# Patient Record
Sex: MALE | Race: WHITE | NOT HISPANIC OR LATINO | URBAN - METROPOLITAN AREA
[De-identification: names, ages, dates, MRNs, and addresses within clinical notes are randomized per-mention and may not be internally consistent; named-entity substitution may affect disease eponyms.]

---

## 2017-01-29 ENCOUNTER — INPATIENT (INPATIENT)
Facility: HOSPITAL | Age: 57
LOS: 0 days | Discharge: HOME | DRG: 390 | End: 2017-01-30
Attending: SPECIALIST | Admitting: SPECIALIST
Payer: MEDICAID

## 2017-01-29 VITALS
DIASTOLIC BLOOD PRESSURE: 75 MMHG | SYSTOLIC BLOOD PRESSURE: 139 MMHG | OXYGEN SATURATION: 97 % | RESPIRATION RATE: 18 BRPM | HEIGHT: 69 IN | TEMPERATURE: 98 F | HEART RATE: 80 BPM | WEIGHT: 195.11 LBS

## 2017-01-29 DIAGNOSIS — K56.69 OTHER INTESTINAL OBSTRUCTION: ICD-10-CM

## 2017-01-29 DIAGNOSIS — Z98.890 OTHER SPECIFIED POSTPROCEDURAL STATES: Chronic | ICD-10-CM

## 2017-01-29 LAB
ALBUMIN SERPL ELPH-MCNC: 4.1 G/DL — SIGNIFICANT CHANGE UP (ref 3.5–5)
ALP SERPL-CCNC: 81 U/L — SIGNIFICANT CHANGE UP (ref 40–120)
ALT FLD-CCNC: 53 U/L DA — SIGNIFICANT CHANGE UP (ref 10–60)
ANION GAP SERPL CALC-SCNC: 9 MMOL/L — SIGNIFICANT CHANGE UP (ref 5–17)
AST SERPL-CCNC: 34 U/L — SIGNIFICANT CHANGE UP (ref 10–40)
BASOPHILS # BLD AUTO: 0.1 K/UL — SIGNIFICANT CHANGE UP (ref 0–0.2)
BASOPHILS NFR BLD AUTO: 0.5 % — SIGNIFICANT CHANGE UP (ref 0–2)
BILIRUB SERPL-MCNC: 1.4 MG/DL — HIGH (ref 0.2–1.2)
BUN SERPL-MCNC: 17 MG/DL — SIGNIFICANT CHANGE UP (ref 7–18)
CALCIUM SERPL-MCNC: 8.9 MG/DL — SIGNIFICANT CHANGE UP (ref 8.4–10.5)
CHLORIDE SERPL-SCNC: 100 MMOL/L — SIGNIFICANT CHANGE UP (ref 96–108)
CO2 SERPL-SCNC: 30 MMOL/L — SIGNIFICANT CHANGE UP (ref 22–31)
CREAT SERPL-MCNC: 1.29 MG/DL — SIGNIFICANT CHANGE UP (ref 0.5–1.3)
EOSINOPHIL # BLD AUTO: 0.2 K/UL — SIGNIFICANT CHANGE UP (ref 0–0.5)
EOSINOPHIL NFR BLD AUTO: 1.2 % — SIGNIFICANT CHANGE UP (ref 0–6)
GLUCOSE SERPL-MCNC: 218 MG/DL — HIGH (ref 70–99)
HCT VFR BLD CALC: 48.4 % — SIGNIFICANT CHANGE UP (ref 39–50)
HGB BLD-MCNC: 16.4 G/DL — SIGNIFICANT CHANGE UP (ref 13–17)
LIDOCAIN IGE QN: 154 U/L — SIGNIFICANT CHANGE UP (ref 73–393)
LYMPHOCYTES # BLD AUTO: 1.3 K/UL — SIGNIFICANT CHANGE UP (ref 1–3.3)
LYMPHOCYTES # BLD AUTO: 9.3 % — LOW (ref 13–44)
MCHC RBC-ENTMCNC: 30.5 PG — SIGNIFICANT CHANGE UP (ref 27–34)
MCHC RBC-ENTMCNC: 33.9 GM/DL — SIGNIFICANT CHANGE UP (ref 32–36)
MCV RBC AUTO: 90 FL — SIGNIFICANT CHANGE UP (ref 80–100)
MONOCYTES # BLD AUTO: 0.7 K/UL — SIGNIFICANT CHANGE UP (ref 0–0.9)
MONOCYTES NFR BLD AUTO: 4.9 % — SIGNIFICANT CHANGE UP (ref 2–14)
NEUTROPHILS # BLD AUTO: 12 K/UL — HIGH (ref 1.8–7.4)
NEUTROPHILS NFR BLD AUTO: 84.2 % — HIGH (ref 43–77)
PLATELET # BLD AUTO: 156 K/UL — SIGNIFICANT CHANGE UP (ref 150–400)
POTASSIUM SERPL-MCNC: 4.7 MMOL/L — SIGNIFICANT CHANGE UP (ref 3.5–5.3)
POTASSIUM SERPL-SCNC: 4.7 MMOL/L — SIGNIFICANT CHANGE UP (ref 3.5–5.3)
PROT SERPL-MCNC: 7.2 G/DL — SIGNIFICANT CHANGE UP (ref 6–8.3)
RBC # BLD: 5.38 M/UL — SIGNIFICANT CHANGE UP (ref 4.2–5.8)
RBC # FLD: 12 % — SIGNIFICANT CHANGE UP (ref 10.3–14.5)
SODIUM SERPL-SCNC: 139 MMOL/L — SIGNIFICANT CHANGE UP (ref 135–145)
WBC # BLD: 14.3 K/UL — HIGH (ref 3.8–10.5)
WBC # FLD AUTO: 14.3 K/UL — HIGH (ref 3.8–10.5)

## 2017-01-29 PROCEDURE — 99285 EMERGENCY DEPT VISIT HI MDM: CPT | Mod: 25

## 2017-01-29 PROCEDURE — 74177 CT ABD & PELVIS W/CONTRAST: CPT | Mod: 26

## 2017-01-29 RX ORDER — DEXTROSE 50 % IN WATER 50 %
1 SYRINGE (ML) INTRAVENOUS ONCE
Qty: 0 | Refills: 0 | Status: DISCONTINUED | OUTPATIENT
Start: 2017-01-29 | End: 2017-01-30

## 2017-01-29 RX ORDER — CIPROFLOXACIN LACTATE 400MG/40ML
400 VIAL (ML) INTRAVENOUS ONCE
Qty: 0 | Refills: 0 | Status: COMPLETED | OUTPATIENT
Start: 2017-01-29 | End: 2017-01-29

## 2017-01-29 RX ORDER — SODIUM CHLORIDE 9 MG/ML
1000 INJECTION, SOLUTION INTRAVENOUS
Qty: 0 | Refills: 0 | Status: DISCONTINUED | OUTPATIENT
Start: 2017-01-29 | End: 2017-01-30

## 2017-01-29 RX ORDER — GLUCAGON INJECTION, SOLUTION 0.5 MG/.1ML
1 INJECTION, SOLUTION SUBCUTANEOUS ONCE
Qty: 0 | Refills: 0 | Status: DISCONTINUED | OUTPATIENT
Start: 2017-01-29 | End: 2017-01-30

## 2017-01-29 RX ORDER — DEXTROSE 50 % IN WATER 50 %
25 SYRINGE (ML) INTRAVENOUS ONCE
Qty: 0 | Refills: 0 | Status: DISCONTINUED | OUTPATIENT
Start: 2017-01-29 | End: 2017-01-30

## 2017-01-29 RX ORDER — METRONIDAZOLE 500 MG
500 TABLET ORAL EVERY 8 HOURS
Qty: 0 | Refills: 0 | Status: DISCONTINUED | OUTPATIENT
Start: 2017-01-29 | End: 2017-01-30

## 2017-01-29 RX ORDER — ONDANSETRON 8 MG/1
4 TABLET, FILM COATED ORAL EVERY 6 HOURS
Qty: 0 | Refills: 0 | Status: DISCONTINUED | OUTPATIENT
Start: 2017-01-29 | End: 2017-01-30

## 2017-01-29 RX ORDER — SODIUM CHLORIDE 9 MG/ML
1000 INJECTION INTRAMUSCULAR; INTRAVENOUS; SUBCUTANEOUS
Qty: 0 | Refills: 0 | Status: DISCONTINUED | OUTPATIENT
Start: 2017-01-29 | End: 2017-01-30

## 2017-01-29 RX ORDER — METRONIDAZOLE 500 MG
TABLET ORAL
Qty: 0 | Refills: 0 | Status: DISCONTINUED | OUTPATIENT
Start: 2017-01-29 | End: 2017-01-30

## 2017-01-29 RX ORDER — HEPARIN SODIUM 5000 [USP'U]/ML
5000 INJECTION INTRAVENOUS; SUBCUTANEOUS EVERY 8 HOURS
Qty: 0 | Refills: 0 | Status: DISCONTINUED | OUTPATIENT
Start: 2017-01-29 | End: 2017-01-30

## 2017-01-29 RX ORDER — CIPROFLOXACIN LACTATE 400MG/40ML
400 VIAL (ML) INTRAVENOUS EVERY 12 HOURS
Qty: 0 | Refills: 0 | Status: DISCONTINUED | OUTPATIENT
Start: 2017-01-29 | End: 2017-01-30

## 2017-01-29 RX ORDER — INSULIN LISPRO 100/ML
VIAL (ML) SUBCUTANEOUS
Qty: 0 | Refills: 0 | Status: DISCONTINUED | OUTPATIENT
Start: 2017-01-29 | End: 2017-01-30

## 2017-01-29 RX ORDER — METRONIDAZOLE 500 MG
500 TABLET ORAL ONCE
Qty: 0 | Refills: 0 | Status: COMPLETED | OUTPATIENT
Start: 2017-01-29 | End: 2017-01-29

## 2017-01-29 RX ORDER — CIPROFLOXACIN LACTATE 400MG/40ML
VIAL (ML) INTRAVENOUS
Qty: 0 | Refills: 0 | Status: DISCONTINUED | OUTPATIENT
Start: 2017-01-29 | End: 2017-01-30

## 2017-01-29 RX ORDER — SODIUM CHLORIDE 9 MG/ML
1000 INJECTION INTRAMUSCULAR; INTRAVENOUS; SUBCUTANEOUS ONCE
Qty: 0 | Refills: 0 | Status: COMPLETED | OUTPATIENT
Start: 2017-01-29 | End: 2017-01-29

## 2017-01-29 RX ORDER — DEXTROSE 50 % IN WATER 50 %
12.5 SYRINGE (ML) INTRAVENOUS ONCE
Qty: 0 | Refills: 0 | Status: DISCONTINUED | OUTPATIENT
Start: 2017-01-29 | End: 2017-01-30

## 2017-01-29 RX ADMIN — HEPARIN SODIUM 5000 UNIT(S): 5000 INJECTION INTRAVENOUS; SUBCUTANEOUS at 22:09

## 2017-01-29 RX ADMIN — SODIUM CHLORIDE 130 MILLILITER(S): 9 INJECTION INTRAMUSCULAR; INTRAVENOUS; SUBCUTANEOUS at 10:00

## 2017-01-29 RX ADMIN — Medication 200 MILLIGRAM(S): at 22:09

## 2017-01-29 RX ADMIN — HEPARIN SODIUM 5000 UNIT(S): 5000 INJECTION INTRAVENOUS; SUBCUTANEOUS at 15:02

## 2017-01-29 RX ADMIN — Medication 100 MILLIGRAM(S): at 11:39

## 2017-01-29 RX ADMIN — SODIUM CHLORIDE 1000 MILLILITER(S): 9 INJECTION INTRAMUSCULAR; INTRAVENOUS; SUBCUTANEOUS at 05:04

## 2017-01-29 RX ADMIN — Medication 100 MILLIGRAM(S): at 22:09

## 2017-01-29 RX ADMIN — Medication 200 MILLIGRAM(S): at 11:21

## 2017-01-29 NOTE — ED ADULT NURSE NOTE - ED STAT RN HANDOFF DETAILS 2
Received pt from ELO Hernandez. Pt is a/o x 3, cooperative.  denies pain or SOB at this time. Will continue to monitor.

## 2017-01-29 NOTE — PATIENT PROFILE ADULT. - TEACHING/LEARNING LEARNING PREFERENCES
group instruction/verbal instruction/video/computer/internet/written material/skill demonstration/pictorial/individual instruction/audio

## 2017-01-29 NOTE — ED PROVIDER NOTE - OBJECTIVE STATEMENT
57 y/o male with PMHx of HTN, DM, and HLD presents to the ED c/o generalized abd pain from 18:00 yesterday. Pain is non radiating comes every half hour, and notes relief with burping. Pt denies nausea, vomiting, or any other complaints. NKDA.

## 2017-01-29 NOTE — PATIENT PROFILE ADULT. - TEACHING/LEARNING LEARNING PREFERENCES OTHER
group instruction/skill demonstration/individual instruction/written material/video/pictorial/verbal instruction/audio/computer/internet

## 2017-01-29 NOTE — H&P ADULT. - HISTORY OF PRESENT ILLNESS
57 yo male pmhx DM, HTN pshx of exploratory lap in 1993 presents with abdominal pain and nausea for one day. He denies vomiting and fevers. He denies any similar episode in the past. Ct in ED shows partial sbo.

## 2017-01-29 NOTE — ED PROVIDER NOTE - NS ED MD SCRIBE ATTENDING SCRIBE SECTIONS
PHYSICAL EXAM/HIV/DISPOSITION/HISTORY OF PRESENT ILLNESS/VITAL SIGNS( Pullset)/REVIEW OF SYSTEMS/PAST MEDICAL/SURGICAL/SOCIAL HISTORY

## 2017-01-30 ENCOUNTER — TRANSCRIPTION ENCOUNTER (OUTPATIENT)
Age: 57
End: 2017-01-30

## 2017-01-30 VITALS
OXYGEN SATURATION: 96 % | SYSTOLIC BLOOD PRESSURE: 127 MMHG | TEMPERATURE: 99 F | HEART RATE: 78 BPM | DIASTOLIC BLOOD PRESSURE: 72 MMHG | RESPIRATION RATE: 16 BRPM

## 2017-01-30 LAB — HBA1C BLD-MCNC: 6.9 % — HIGH (ref 4–5.6)

## 2017-01-30 PROCEDURE — 74000: CPT | Mod: 26

## 2017-01-30 RX ORDER — MOXIFLOXACIN HYDROCHLORIDE TABLETS, 400 MG 400 MG/1
1 TABLET, FILM COATED ORAL
Qty: 14 | Refills: 0
Start: 2017-01-30 | End: 2017-02-06

## 2017-01-30 RX ORDER — METRONIDAZOLE 500 MG
1 TABLET ORAL
Qty: 21 | Refills: 0
Start: 2017-01-30 | End: 2017-02-06

## 2017-01-30 RX ADMIN — HEPARIN SODIUM 5000 UNIT(S): 5000 INJECTION INTRAVENOUS; SUBCUTANEOUS at 13:14

## 2017-01-30 RX ADMIN — Medication 100 MILLIGRAM(S): at 13:15

## 2017-01-30 RX ADMIN — Medication 100 MILLIGRAM(S): at 05:23

## 2017-01-30 RX ADMIN — Medication 200 MILLIGRAM(S): at 11:36

## 2017-01-30 RX ADMIN — HEPARIN SODIUM 5000 UNIT(S): 5000 INJECTION INTRAVENOUS; SUBCUTANEOUS at 05:23

## 2017-01-30 NOTE — DISCHARGE NOTE ADULT - CARE PLAN
Principal Discharge DX:	Partial small bowel obstruction  Goal:	Tolerate diet, pain free  Instructions for follow-up, activity and diet:	Please follow up with Dr. Celestin within 1 week   to have small bowel series   Diet as tolerated

## 2017-01-30 NOTE — DISCHARGE NOTE ADULT - PLAN OF CARE
Tolerate diet, pain free Please follow up with Dr. Celestin within 1 week   to have small bowel series   Diet as tolerated

## 2017-01-30 NOTE — DISCHARGE NOTE ADULT - MEDICATION SUMMARY - MEDICATIONS TO TAKE
I will START or STAY ON the medications listed below when I get home from the hospital:    Flagyl 500 mg oral tablet  -- 1 tab(s) by mouth every 8 hours  -- Do not drink alcoholic beverages when taking this medication.  Finish all this medication unless otherwise directed by prescriber.  May discolor urine or feces.    -- Indication: For enteritis     losartan  --  by mouth   -- Indication: For HTN     metFORMIN  --  by mouth   -- Indication: For DMII     simvastatin  --  by mouth   -- Indication: For HLD     Cipro 500 mg oral tablet  -- 1 tab(s) by mouth every 12 hours  -- Avoid prolonged or excessive exposure to direct and/or artificial sunlight while taking this medication.  Check with your doctor before becoming pregnant.  Do not take dairy products, antacids, or iron preparations within one hour of this medication.  Finish all this medication unless otherwise directed by prescriber.  Medication should be taken with plenty of water.    -- Indication: For Enteritis

## 2017-01-30 NOTE — DISCHARGE NOTE ADULT - HOSPITAL COURSE
57 yo male pmhx DM, HTN pshx of exploratory lap in 1993 presents with abdominal pain and nausea for one day. He denies vomiting and fevers. He denies any similar episode in the past. Ct in ED shows partial sbo.    Patient was admitted and started on IV fluid hydration and antibiotics. patients symptoms resolved. Patient had bowel function and tolerated diet. Small bowel series could no be performed due to previous contrast from CT seen in colon. Patient for outpatient follow up with Dr. Celestin within 1 week.

## 2017-01-30 NOTE — DISCHARGE NOTE ADULT - OTHER SIGNIFICANT FINDINGS
Patient ID: [id number] Patient Name: CAROLINA SILVA   YOB: 1960 Sex: M      EXAM: CT ABDOMEN AND PELVIS OC IC      PROCEDURE DATE: 01/29/2017      INTERPRETATION: CLINICAL HISTORY: Abdominal pain.    TECHNIQUE: CT scan of the abdomen and pelvis with IV contrast.  Transaxial images were acquired from the domes of the diaphragm to the  symphysis pubis with intravenous contrast. Oral contrast was administered.  Coronal and sagittal images were also provided from the transaxial source  data. 95mLs of Omnipaque 350 was administered intravenously without  complication and 5 mLs was discarded.    COMPARISON: There is no prior study for comparison.    FINDINGS:  The heart is not enlarged. The lung bases are clear.    The mildly dilated loops of proximal small bowel which taper to more  collapsed loops of small bowel in the right lower quadrant on series 2  images 88 through 94. Loops of distal small bowel are not completely  collapsed and demonstrating fluid and mild coastal hyperemia. There is small  volume perihepatic and pelvic ascites. Trace interloop mesenteric edema is  noted. There is no free intraperitoneal air or abdominal abscess. The  appendix is normal. There is no abnormal bowel wall thickening.    There is diffuse low-attenuation of the liver compared to the spleen  compatible fatty infiltration. The gallbladder, spleen, pancreas and adrenal  glands are unremarkable. The kidneys enhance symmetrically without  hydronephrosis. There is a small left renal cyst.    The abdominal aorta is normal in caliber. There is no retroperitoneal,  pelvic or inguinal adenopathy.    The urinary bladder is unremarkable. The prostate gland is not enlarged.    There are bilateral pars interarticularis defects at L5 without associated  anterior spondylolisthesis. The visualized osseous structures demonstrate no  acute abnormality.    IMPRESSION:  Partial small bowel obstruction possibly due to enteritis with mild  inflammatory or infectious stricturing in the right lower quadrant. Consider  follow-up small bowel series to track progression of oral contrast into the  colon. No evidence of intestinal ischemia at this time.    Hepatic steatosis.              LUCIAN BARRIENTOS M.D., RADIOLOGIST  This document has been electronically signed. Jan 29 2017 6:32AM

## 2017-02-01 DIAGNOSIS — E11.9 TYPE 2 DIABETES MELLITUS WITHOUT COMPLICATIONS: ICD-10-CM

## 2017-02-01 DIAGNOSIS — K56.69 OTHER INTESTINAL OBSTRUCTION: ICD-10-CM

## 2017-02-01 DIAGNOSIS — I10 ESSENTIAL (PRIMARY) HYPERTENSION: ICD-10-CM

## 2017-02-01 DIAGNOSIS — E78.5 HYPERLIPIDEMIA, UNSPECIFIED: ICD-10-CM

## 2017-07-23 PROCEDURE — 80053 COMPREHEN METABOLIC PANEL: CPT

## 2017-07-23 PROCEDURE — 74018 RADEX ABDOMEN 1 VIEW: CPT

## 2017-07-23 PROCEDURE — 83690 ASSAY OF LIPASE: CPT

## 2017-07-23 PROCEDURE — 83036 HEMOGLOBIN GLYCOSYLATED A1C: CPT

## 2017-07-23 PROCEDURE — 85027 COMPLETE CBC AUTOMATED: CPT

## 2017-07-23 PROCEDURE — 99285 EMERGENCY DEPT VISIT HI MDM: CPT | Mod: 25

## 2018-02-11 NOTE — DISCHARGE NOTE ADULT - PATIENT PORTAL LINK FT
“You can access the FollowHealth Patient Portal, offered by [facility], by registering with the following website: [email or web address]”
English

## 2019-06-13 PROBLEM — E11.9 TYPE 2 DIABETES MELLITUS WITHOUT COMPLICATIONS: Chronic | Status: ACTIVE | Noted: 2017-01-29

## 2019-06-13 PROBLEM — I10 ESSENTIAL (PRIMARY) HYPERTENSION: Chronic | Status: ACTIVE | Noted: 2017-01-29

## 2019-06-13 PROBLEM — E78.00 PURE HYPERCHOLESTEROLEMIA, UNSPECIFIED: Chronic | Status: ACTIVE | Noted: 2017-01-29

## 2019-06-17 ENCOUNTER — APPOINTMENT (OUTPATIENT)
Dept: SURGERY | Facility: CLINIC | Age: 59
End: 2019-06-17
Payer: MEDICAID

## 2019-06-17 VITALS
HEIGHT: 64 IN | DIASTOLIC BLOOD PRESSURE: 87 MMHG | BODY MASS INDEX: 35.68 KG/M2 | SYSTOLIC BLOOD PRESSURE: 131 MMHG | TEMPERATURE: 98 F | WEIGHT: 209 LBS | HEART RATE: 86 BPM

## 2019-06-17 DIAGNOSIS — Z87.891 PERSONAL HISTORY OF NICOTINE DEPENDENCE: ICD-10-CM

## 2019-06-17 DIAGNOSIS — Z83.3 FAMILY HISTORY OF DIABETES MELLITUS: ICD-10-CM

## 2019-06-17 DIAGNOSIS — Z78.9 OTHER SPECIFIED HEALTH STATUS: ICD-10-CM

## 2019-06-17 DIAGNOSIS — Z81.8 FAMILY HISTORY OF OTHER MENTAL AND BEHAVIORAL DISORDERS: ICD-10-CM

## 2019-06-17 DIAGNOSIS — Z86.79 PERSONAL HISTORY OF OTHER DISEASES OF THE CIRCULATORY SYSTEM: ICD-10-CM

## 2019-06-17 DIAGNOSIS — Z86.39 PERSONAL HISTORY OF OTHER ENDOCRINE, NUTRITIONAL AND METABOLIC DISEASE: ICD-10-CM

## 2019-06-17 PROCEDURE — 99203 OFFICE O/P NEW LOW 30 MIN: CPT

## 2019-06-28 ENCOUNTER — OUTPATIENT (OUTPATIENT)
Dept: OUTPATIENT SERVICES | Facility: HOSPITAL | Age: 59
LOS: 1 days | End: 2019-06-28
Payer: MEDICAID

## 2019-06-28 VITALS
DIASTOLIC BLOOD PRESSURE: 76 MMHG | HEIGHT: 68 IN | SYSTOLIC BLOOD PRESSURE: 119 MMHG | RESPIRATION RATE: 16 BRPM | TEMPERATURE: 98 F | OXYGEN SATURATION: 96 % | HEART RATE: 93 BPM | WEIGHT: 201.94 LBS

## 2019-06-28 DIAGNOSIS — Z01.818 ENCOUNTER FOR OTHER PREPROCEDURAL EXAMINATION: ICD-10-CM

## 2019-06-28 DIAGNOSIS — K43.2 INCISIONAL HERNIA WITHOUT OBSTRUCTION OR GANGRENE: ICD-10-CM

## 2019-06-28 DIAGNOSIS — Z98.890 OTHER SPECIFIED POSTPROCEDURAL STATES: Chronic | ICD-10-CM

## 2019-06-28 DIAGNOSIS — I10 ESSENTIAL (PRIMARY) HYPERTENSION: ICD-10-CM

## 2019-06-28 DIAGNOSIS — Z87.19 PERSONAL HISTORY OF OTHER DISEASES OF THE DIGESTIVE SYSTEM: Chronic | ICD-10-CM

## 2019-06-28 LAB — BLD GP AB SCN SERPL QL: SIGNIFICANT CHANGE UP

## 2019-06-28 PROCEDURE — 86901 BLOOD TYPING SEROLOGIC RH(D): CPT

## 2019-06-28 PROCEDURE — 86900 BLOOD TYPING SEROLOGIC ABO: CPT

## 2019-06-28 PROCEDURE — G0463: CPT

## 2019-06-28 PROCEDURE — 86850 RBC ANTIBODY SCREEN: CPT

## 2019-06-28 PROCEDURE — 36415 COLL VENOUS BLD VENIPUNCTURE: CPT

## 2019-06-28 RX ORDER — METFORMIN HYDROCHLORIDE 850 MG/1
0 TABLET ORAL
Qty: 0 | Refills: 0 | DISCHARGE

## 2019-06-28 RX ORDER — SIMVASTATIN 20 MG/1
0 TABLET, FILM COATED ORAL
Qty: 0 | Refills: 0 | DISCHARGE

## 2019-06-28 RX ORDER — LOSARTAN POTASSIUM 100 MG/1
0 TABLET, FILM COATED ORAL
Qty: 0 | Refills: 0 | DISCHARGE

## 2019-06-28 NOTE — H&P PST ADULT - NEGATIVE CARDIOVASCULAR SYMPTOMS
no chest pain/no claudication/no palpitations/no orthopnea/no dyspnea on exertion/no peripheral edema

## 2019-06-28 NOTE — H&P PST ADULT - HISTORY OF PRESENT ILLNESS
58 yo male with history of HTN, HLD, DM, Obesity, reports the above. Patient states that he noticed the one in the umbilicus x 2 years ago

## 2019-06-28 NOTE — H&P PST ADULT - NSICDXPROBLEM_GEN_ALL_CORE_FT
PROBLEM DIAGNOSES  Problem: Incisional hernia without obstruction or gangrene  Assessment and Plan: Laparoscopic Incisional Hernia Possible Open    Problem: Hypertension  Assessment and Plan: Continue BP medication daily, including the day of the procedure

## 2019-06-28 NOTE — H&P PST ADULT - NSICDXPASTSURGICALHX_GEN_ALL_CORE_FT
PAST SURGICAL HISTORY:  H/O exploratory laparotomy     H/O: duodenal ulcer     History of removal of cyst In the throat

## 2019-06-28 NOTE — H&P PST ADULT - REASON FOR ADMISSION
Discomfort in the abdomen when turning from one side to the other while in bed. My doctor said that I have hernia; one in the umbilicus, one next to the umbilicus.

## 2019-07-05 ENCOUNTER — OUTPATIENT (OUTPATIENT)
Dept: OUTPATIENT SERVICES | Facility: HOSPITAL | Age: 59
LOS: 1 days | End: 2019-07-05
Payer: MEDICAID

## 2019-07-05 VITALS
RESPIRATION RATE: 17 BRPM | HEART RATE: 80 BPM | SYSTOLIC BLOOD PRESSURE: 131 MMHG | DIASTOLIC BLOOD PRESSURE: 78 MMHG | OXYGEN SATURATION: 98 % | TEMPERATURE: 98 F

## 2019-07-05 VITALS
SYSTOLIC BLOOD PRESSURE: 152 MMHG | DIASTOLIC BLOOD PRESSURE: 87 MMHG | TEMPERATURE: 99 F | HEIGHT: 68 IN | RESPIRATION RATE: 14 BRPM | WEIGHT: 201.94 LBS | OXYGEN SATURATION: 97 % | HEART RATE: 96 BPM

## 2019-07-05 DIAGNOSIS — Z98.890 OTHER SPECIFIED POSTPROCEDURAL STATES: Chronic | ICD-10-CM

## 2019-07-05 DIAGNOSIS — Z01.818 ENCOUNTER FOR OTHER PREPROCEDURAL EXAMINATION: ICD-10-CM

## 2019-07-05 DIAGNOSIS — K43.2 INCISIONAL HERNIA WITHOUT OBSTRUCTION OR GANGRENE: ICD-10-CM

## 2019-07-05 DIAGNOSIS — Z87.19 PERSONAL HISTORY OF OTHER DISEASES OF THE DIGESTIVE SYSTEM: Chronic | ICD-10-CM

## 2019-07-05 PROBLEM — K64.9 UNSPECIFIED HEMORRHOIDS: Chronic | Status: ACTIVE | Noted: 2019-06-28

## 2019-07-05 LAB
BLD GP AB SCN SERPL QL: SIGNIFICANT CHANGE UP
GLUCOSE BLDC GLUCOMTR-MCNC: 142 MG/DL — HIGH (ref 70–99)
GLUCOSE BLDC GLUCOMTR-MCNC: 142 MG/DL — HIGH (ref 70–99)

## 2019-07-05 PROCEDURE — 86850 RBC ANTIBODY SCREEN: CPT

## 2019-07-05 PROCEDURE — 86901 BLOOD TYPING SEROLOGIC RH(D): CPT

## 2019-07-05 PROCEDURE — 49654: CPT

## 2019-07-05 PROCEDURE — C1781: CPT

## 2019-07-05 PROCEDURE — 86900 BLOOD TYPING SEROLOGIC ABO: CPT

## 2019-07-05 PROCEDURE — 82962 GLUCOSE BLOOD TEST: CPT

## 2019-07-05 PROCEDURE — C1889: CPT

## 2019-07-05 PROCEDURE — 36415 COLL VENOUS BLD VENIPUNCTURE: CPT

## 2019-07-05 PROCEDURE — 49654: CPT | Mod: AS

## 2019-07-05 RX ORDER — HYDROMORPHONE HYDROCHLORIDE 2 MG/ML
1 INJECTION INTRAMUSCULAR; INTRAVENOUS; SUBCUTANEOUS
Refills: 0 | Status: DISCONTINUED | OUTPATIENT
Start: 2019-07-05 | End: 2019-07-05

## 2019-07-05 RX ORDER — ONDANSETRON 8 MG/1
4 TABLET, FILM COATED ORAL EVERY 6 HOURS
Refills: 0 | Status: DISCONTINUED | OUTPATIENT
Start: 2019-07-05 | End: 2019-07-13

## 2019-07-05 RX ORDER — ASPIRIN/CALCIUM CARB/MAGNESIUM 324 MG
1 TABLET ORAL
Qty: 0 | Refills: 0 | DISCHARGE

## 2019-07-05 RX ORDER — SODIUM CHLORIDE 9 MG/ML
3 INJECTION INTRAMUSCULAR; INTRAVENOUS; SUBCUTANEOUS EVERY 8 HOURS
Refills: 0 | Status: DISCONTINUED | OUTPATIENT
Start: 2019-07-05 | End: 2019-07-05

## 2019-07-05 RX ORDER — OXYCODONE AND ACETAMINOPHEN 5; 325 MG/1; MG/1
1 TABLET ORAL EVERY 4 HOURS
Refills: 0 | Status: DISCONTINUED | OUTPATIENT
Start: 2019-07-05 | End: 2019-07-05

## 2019-07-05 RX ORDER — ASCORBIC ACID 60 MG
1 TABLET,CHEWABLE ORAL
Qty: 0 | Refills: 0 | DISCHARGE

## 2019-07-05 RX ORDER — METFORMIN HYDROCHLORIDE 850 MG/1
1 TABLET ORAL
Qty: 0 | Refills: 0 | DISCHARGE

## 2019-07-05 RX ORDER — ONDANSETRON 8 MG/1
4 TABLET, FILM COATED ORAL ONCE
Refills: 0 | Status: DISCONTINUED | OUTPATIENT
Start: 2019-07-05 | End: 2019-07-05

## 2019-07-05 RX ORDER — LINAGLIPTIN 5 MG/1
1 TABLET, FILM COATED ORAL
Qty: 0 | Refills: 0 | DISCHARGE

## 2019-07-05 RX ORDER — CHOLECALCIFEROL (VITAMIN D3) 125 MCG
1 CAPSULE ORAL
Qty: 0 | Refills: 0 | DISCHARGE

## 2019-07-05 RX ORDER — SODIUM CHLORIDE 9 MG/ML
1000 INJECTION, SOLUTION INTRAVENOUS
Refills: 0 | Status: DISCONTINUED | OUTPATIENT
Start: 2019-07-05 | End: 2019-07-05

## 2019-07-05 RX ORDER — SIMVASTATIN 20 MG/1
1 TABLET, FILM COATED ORAL
Qty: 0 | Refills: 0 | DISCHARGE

## 2019-07-05 RX ORDER — LOSARTAN POTASSIUM 100 MG/1
1 TABLET, FILM COATED ORAL
Qty: 0 | Refills: 0 | DISCHARGE

## 2019-07-05 RX ORDER — HYDROMORPHONE HYDROCHLORIDE 2 MG/ML
0.5 INJECTION INTRAMUSCULAR; INTRAVENOUS; SUBCUTANEOUS
Refills: 0 | Status: DISCONTINUED | OUTPATIENT
Start: 2019-07-05 | End: 2019-07-05

## 2019-07-05 RX ADMIN — HYDROMORPHONE HYDROCHLORIDE 0.5 MILLIGRAM(S): 2 INJECTION INTRAMUSCULAR; INTRAVENOUS; SUBCUTANEOUS at 09:17

## 2019-07-05 RX ADMIN — HYDROMORPHONE HYDROCHLORIDE 0.5 MILLIGRAM(S): 2 INJECTION INTRAMUSCULAR; INTRAVENOUS; SUBCUTANEOUS at 11:00

## 2019-07-05 NOTE — BRIEF OPERATIVE NOTE - NSICDXBRIEFPROCEDURE_GEN_ALL_CORE_FT
PROCEDURES:  Laparoscopic repair of incisional hernia using synthetic mesh 05-Jul-2019 08:45:19  Armin Sexton

## 2019-07-05 NOTE — ASU DISCHARGE PLAN (ADULT/PEDIATRIC) - CALL YOUR DOCTOR IF YOU HAVE ANY OF THE FOLLOWING:
Wound/Surgical Site with redness, or foul smelling discharge or pus/Fever greater than (need to indicate Fahrenheit or Celsius)/Nausea and vomiting that does not stop/Unable to urinate/Inability to tolerate liquids or foods/Pain not relieved by Medications/Bleeding that does not stop/Swelling that gets worse

## 2019-07-08 DIAGNOSIS — R21 RASH AND OTHER NONSPECIFIC SKIN ERUPTION: ICD-10-CM

## 2019-07-08 RX ORDER — HYDROCORTISONE 25 MG/G
2.5 CREAM TOPICAL TWICE DAILY
Qty: 1 | Refills: 0 | Status: ACTIVE | COMMUNITY
Start: 2019-07-08 | End: 1900-01-01

## 2019-07-11 RX ORDER — MULTIVITAMIN
TABLET ORAL
Refills: 0 | Status: ACTIVE | COMMUNITY

## 2019-07-11 RX ORDER — METFORMIN HYDROCHLORIDE 625 MG/1
TABLET ORAL
Refills: 0 | Status: ACTIVE | COMMUNITY

## 2019-07-11 RX ORDER — LOSARTAN POTASSIUM 100 MG/1
TABLET, FILM COATED ORAL
Refills: 0 | Status: ACTIVE | COMMUNITY

## 2019-07-11 RX ORDER — ASPIRIN 81 MG
81 TABLET,CHEWABLE ORAL
Refills: 0 | Status: ACTIVE | COMMUNITY

## 2019-07-11 RX ORDER — SIMVASTATIN 80 MG/1
TABLET, FILM COATED ORAL
Refills: 0 | Status: ACTIVE | COMMUNITY

## 2019-07-12 ENCOUNTER — APPOINTMENT (OUTPATIENT)
Dept: SURGERY | Facility: CLINIC | Age: 59
End: 2019-07-12
Payer: MEDICAID

## 2019-07-12 VITALS
SYSTOLIC BLOOD PRESSURE: 130 MMHG | BODY MASS INDEX: 35.68 KG/M2 | OXYGEN SATURATION: 97 % | DIASTOLIC BLOOD PRESSURE: 84 MMHG | HEIGHT: 64 IN | WEIGHT: 209 LBS | HEART RATE: 91 BPM | TEMPERATURE: 98.7 F

## 2019-07-12 PROCEDURE — 99024 POSTOP FOLLOW-UP VISIT: CPT

## 2019-07-12 NOTE — PLAN
[FreeTextEntry1] : incision sites healing appropriately, no infection noted\par he has a localized hematoma 2/2 recent trauma to abdomen \par patient will resume taking Aspirin, next week \par Warning signs, follow up, and restrictions were discussed with the patient.\par patient instructed to avoid strenuous activity/heavy lifting \par he has a follow up appointment scheduled 07/18/19; Call with concerns

## 2019-07-12 NOTE — HISTORY OF PRESENT ILLNESS
[de-identified] : 59 y.o M who is s/p laparoscopic repair of incisional hernia 07/05/19, presents to the office today for f/u visit. Patient complains of having some bruising\par to the abdominal region after he experienced some trauma to the area, yesterday. He is taking ASA 81 mg. Patient denies any fevers/chills, he denies\par abdominal pain, no nausea/vomiting. He states he is concerned about the recent bruise, otherwise, feeling well.

## 2019-07-12 NOTE — PHYSICAL EXAM
[Normal Breath Sounds] : Normal breath sounds [Normal Rate and Rhythm] : normal rate and rhythm [No Rash or Lesion] : No rash or lesion [Alert] : alert [Oriented to Place] : oriented to place [Oriented to Person] : oriented to person [Oriented to Time] : oriented to time [Calm] : calm [de-identified] : A/Ox3; NAD. appears comfortable  [de-identified] : sclera anicteric  [de-identified] : abd is soft, NT/ND; incision sites without evidence of any infection, healing appropriately, no exudate. He has a localized hematoma below  umbilical region; no tenderness.

## 2019-07-12 NOTE — REASON FOR VISIT
[Follow-Up: _____] : a [unfilled] follow-up visit [FreeTextEntry1] : s/p laparoscopic repair of incisional hernia with mesh 07/05/19

## 2019-07-18 ENCOUNTER — APPOINTMENT (OUTPATIENT)
Dept: SURGERY | Facility: CLINIC | Age: 59
End: 2019-07-18
Payer: MEDICAID

## 2019-07-18 PROCEDURE — 99024 POSTOP FOLLOW-UP VISIT: CPT

## 2019-07-18 NOTE — PHYSICAL EXAM
[de-identified] : The patient is alert, well-groomed, and cheerful. [de-identified] : Surgical wounds are  healing well.   no signs of  inflammation or infection. no recurrence

## 2019-07-18 NOTE — HISTORY OF PRESENT ILLNESS
[de-identified] : Patient is s/p Laparoscopic repair of incisional hernia using synthetic mesh on 07/05/2019. Today patient offers no complaints. patient reports no fever, chills,  or  pain.  Surgical wounds are  healing well. No signs of inflammation, infection or exudate. no recurrence. slight ecchymosis in the right lower quadrant. Patient reports good bowel movements and appetite.

## 2019-10-21 ENCOUNTER — APPOINTMENT (OUTPATIENT)
Dept: SURGERY | Facility: CLINIC | Age: 59
End: 2019-10-21
Payer: MEDICAID

## 2019-10-21 PROCEDURE — 99213 OFFICE O/P EST LOW 20 MIN: CPT

## 2020-07-24 ENCOUNTER — EMERGENCY (EMERGENCY)
Facility: HOSPITAL | Age: 60
LOS: 1 days | Discharge: HOME | End: 2020-07-24
Attending: EMERGENCY MEDICINE
Payer: MEDICAID

## 2020-07-24 VITALS
WEIGHT: 202.83 LBS | RESPIRATION RATE: 16 BRPM | HEIGHT: 67.32 IN | DIASTOLIC BLOOD PRESSURE: 87 MMHG | SYSTOLIC BLOOD PRESSURE: 144 MMHG | TEMPERATURE: 98 F | HEART RATE: 102 BPM | OXYGEN SATURATION: 97 %

## 2020-07-24 DIAGNOSIS — Z98.890 OTHER SPECIFIED POSTPROCEDURAL STATES: Chronic | ICD-10-CM

## 2020-07-24 DIAGNOSIS — Z87.19 PERSONAL HISTORY OF OTHER DISEASES OF THE DIGESTIVE SYSTEM: Chronic | ICD-10-CM

## 2020-07-24 PROCEDURE — 99283 EMERGENCY DEPT VISIT LOW MDM: CPT

## 2020-07-24 PROCEDURE — 82962 GLUCOSE BLOOD TEST: CPT

## 2020-07-24 PROCEDURE — 93005 ELECTROCARDIOGRAM TRACING: CPT

## 2020-07-24 NOTE — ED PROVIDER NOTE - CARDIAC, MLM
Normal rate, regular rhythm.  Heart sounds S1, S2.  No murmurs, rubs or gallops. Blood pressure 144/87. Heart rate of 102 bpm.

## 2020-07-24 NOTE — ED PROVIDER NOTE - PATIENT PORTAL LINK FT
You can access the FollowMyHealth Patient Portal offered by [facility] by registering at the following website: [email or web address]. By joining [employer]’s FollowMyHealth portal, you will also be able to view your health information using other applications (apps) compatible with our system.

## 2020-07-24 NOTE — ED PROVIDER NOTE - NSFOLLOWUPCLINICS_GEN_ALL_ED_FT
[facility]  [facility]  [address] - [address] [place] [address]  Phone: [phone]  Fax: [phone]  Follow Up Time:

## 2020-07-24 NOTE — ED PROVIDER NOTE - NSFOLLOWUPINSTRUCTIONS_ED_ALL_ED_FT
Take meds as prescribed by your doctor.  Follow up with your doctor/cardiologist or in the Clinic as discussed within 2-3 days.  Return to the ER for headaches, dizziness, blurry vision, chest pain, shortness of breath or any other concerns.

## 2020-07-24 NOTE — ED PROVIDER NOTE - OBJECTIVE STATEMENT
60 year old male with PMHx of hypertension, high cholesterol, hemorrhoids, duodenal ulcer, and diabetes and PSHx of an exploratory laparotomy and throat cyst removal presents to the ED with complaints of elevated blood pressure today. Patient reports that earlier today he visited his cardiologist for a routine check up and was informed that his blood pressure was elevated in the 160s/ 90s. Patient states that he was otherwise asymptomatic at the time. Patient reports that he was advised by his cardiologist to increase his dosage of Losartan from 25 mg to 50 mg. Patient states that he then repeatedly measured his blood pressure at home, and found his pressure to be elevated several times. Patient denies any headaches, dizziness, blurred vision, chest pain, shortness of breath, palpitations, and all other acute symptoms. Patient endorses that at times he becomes anxious while he is measuring his pressure, but denies any chest pain or shortness of breath. NKDA.

## 2021-01-07 ENCOUNTER — APPOINTMENT (OUTPATIENT)
Dept: SURGERY | Facility: CLINIC | Age: 61
End: 2021-01-07
Payer: MEDICAID

## 2021-01-07 VITALS
SYSTOLIC BLOOD PRESSURE: 147 MMHG | BODY MASS INDEX: 34.15 KG/M2 | HEART RATE: 75 BPM | WEIGHT: 200 LBS | DIASTOLIC BLOOD PRESSURE: 85 MMHG | OXYGEN SATURATION: 96 % | HEIGHT: 64 IN

## 2021-01-07 VITALS — TEMPERATURE: 96.3 F

## 2021-01-07 DIAGNOSIS — R10.31 RIGHT LOWER QUADRANT PAIN: ICD-10-CM

## 2021-01-07 DIAGNOSIS — K43.2 INCISIONAL HERNIA W/OUT OBSTRUCTION OR GANGRENE: ICD-10-CM

## 2021-01-07 DIAGNOSIS — E11.9 TYPE 2 DIABETES MELLITUS W/OUT COMPLICATIONS: ICD-10-CM

## 2021-01-07 PROCEDURE — 99213 OFFICE O/P EST LOW 20 MIN: CPT

## 2021-01-07 PROCEDURE — 99072 ADDL SUPL MATRL&STAF TM PHE: CPT

## 2021-01-07 NOTE — HISTORY OF PRESENT ILLNESS
[de-identified] : Patient is s/p Laparoscopic repair of incisional hernia using synthetic mesh on 07/05/2019. Mr. CAROLINA SILVA was referred by Dr. Tony Lock with the chief complaint of having pain  his Right  groin.  He has had the condition for 1 week  and is worse when he is walking or standing.   the pain started radiating to the back. He is stating that the  pain has improved since the initial episode.  He denies any fever or  night sweats. Appetite is good and weight is stable.  \par  \par

## 2021-01-07 NOTE — PLAN
[FreeTextEntry1] : Mr. SILVA  is here for a resolving right groin pain. his exam is negative.  he  was told significance of findings, options, risks and benefits were explained.    All surgical options were discussed including non-surgical treatments.   he was advised to take NSAIDs for pain PRN. \par Patient advised to seek immediate medical attention with any acute change in symptoms or with the development of any new or worsening symptoms.  Patient's questions and concerns addressed to patient's satisfaction, and patient verbalized an understanding of the information discussed.\par \par

## 2021-01-07 NOTE — PHYSICAL EXAM
[Alert] : alert [Oriented to Person] : oriented to person [Oriented to Place] : oriented to place [Oriented to Time] : oriented to time [Calm] : calm [de-identified] : He  is alert, well-groomed, and cheerful.\par   [de-identified] : anicteric.  Nasal mucosa pink, septum midline. Oral mucosa pink.  Tongue midline, Pharynx without exudates.\par   [de-identified] : Neck supple. Trachea midline. Thyroid isthmus barely palpable, lobes not felt.\par   [de-identified] :  No evidence of hernia .   No penile discharge or lesions.  No scrotal swelling or discoloration. Testes descended bilaterally, smooth, without masses. Epididymis non-tender.

## 2023-08-05 NOTE — ED ADULT NURSE NOTE - CARDIO WDL
Addended by: MARICEL ANDREA on: 8/4/2023 07:54 PM     Modules accepted: Level of Service     Normal rate, regular rhythm, normal S1, S2 heart sounds heard.

## 2024-03-27 NOTE — ED PROVIDER NOTE - MEDICAL DECISION MAKING DETAILS
20 Dressing: dry sterile dressing 57 y/o male with abd pain. Will do labs and CT to r/o acute pathology, then reassess.

## 2024-12-14 ENCOUNTER — EMERGENCY (EMERGENCY)
Facility: HOSPITAL | Age: 64
LOS: 1 days | Discharge: HOME | End: 2024-12-14
Attending: EMERGENCY MEDICINE
Payer: MEDICARE

## 2024-12-14 VITALS
HEIGHT: 69 IN | TEMPERATURE: 98 F | WEIGHT: 195.99 LBS | SYSTOLIC BLOOD PRESSURE: 143 MMHG | DIASTOLIC BLOOD PRESSURE: 92 MMHG | RESPIRATION RATE: 17 BRPM | OXYGEN SATURATION: 97 % | HEART RATE: 81 BPM

## 2024-12-14 VITALS
TEMPERATURE: 98 F | OXYGEN SATURATION: 98 % | DIASTOLIC BLOOD PRESSURE: 79 MMHG | SYSTOLIC BLOOD PRESSURE: 145 MMHG | RESPIRATION RATE: 18 BRPM | HEART RATE: 71 BPM

## 2024-12-14 DIAGNOSIS — Z98.890 OTHER SPECIFIED POSTPROCEDURAL STATES: Chronic | ICD-10-CM

## 2024-12-14 DIAGNOSIS — Z87.19 PERSONAL HISTORY OF OTHER DISEASES OF THE DIGESTIVE SYSTEM: Chronic | ICD-10-CM

## 2024-12-14 LAB — HIV 1 & 2 AB SERPL IA.RAPID: SIGNIFICANT CHANGE UP

## 2024-12-14 PROCEDURE — 70450 CT HEAD/BRAIN W/O DYE: CPT | Mod: MC

## 2024-12-14 PROCEDURE — 99284 EMERGENCY DEPT VISIT MOD MDM: CPT | Mod: 25

## 2024-12-14 PROCEDURE — 86618 LYME DISEASE ANTIBODY: CPT

## 2024-12-14 PROCEDURE — 99284 EMERGENCY DEPT VISIT MOD MDM: CPT

## 2024-12-14 PROCEDURE — 82962 GLUCOSE BLOOD TEST: CPT

## 2024-12-14 PROCEDURE — 70450 CT HEAD/BRAIN W/O DYE: CPT | Mod: 26,MC

## 2024-12-14 PROCEDURE — 36415 COLL VENOUS BLD VENIPUNCTURE: CPT

## 2024-12-14 PROCEDURE — 86803 HEPATITIS C AB TEST: CPT

## 2024-12-14 PROCEDURE — 86703 HIV-1/HIV-2 1 RESULT ANTBDY: CPT

## 2024-12-14 RX ORDER — PREDNISONE 20 MG/1
1 TABLET ORAL
Qty: 5 | Refills: 0
Start: 2024-12-14 | End: 2024-12-18

## 2024-12-14 RX ORDER — PREDNISONE 20 MG/1
50 TABLET ORAL ONCE
Refills: 0 | Status: COMPLETED | OUTPATIENT
Start: 2024-12-14 | End: 2024-12-14

## 2024-12-14 RX ADMIN — PREDNISONE 50 MILLIGRAM(S): 20 TABLET ORAL at 18:36

## 2024-12-14 NOTE — ED PROVIDER NOTE - ENMT, MLM
Airway patent, Nasal mucosa clear. Mouth with normal mucosa. Throat has no vesicles, no oropharyngeal exudates and uvula is midline.   tongue in midline

## 2024-12-14 NOTE — ED PROVIDER NOTE - CLINICAL SUMMARY MEDICAL DECISION MAKING FREE TEXT BOX
64-year-old male with facial asymmetry, unable to close left eye.  Patient with Bell's palsy, will get CT head, Lyme's titer, give prednisone and reassess

## 2024-12-14 NOTE — ED PROVIDER NOTE - PROGRESS NOTE DETAILS
CT head negative, result explained to patient and family   advised to use artificial tear for his left eye, to cover left eye with patch when sleeping or glasses to protect his left eye when outside   patient has his own neurology, advised to follow-up with neuro within next few days

## 2024-12-14 NOTE — ED ADULT TRIAGE NOTE - SOURCE OF INFORMATION
Bed: ED07  Expected date:   Expected time:   Means of arrival:   Comments:  EMS  
Drink and crackers given  
Son is requesting we test patient for covid while here  
Patient

## 2024-12-14 NOTE — ED PROVIDER NOTE - PHYSICAL EXAMINATION
unable to close left eye completely   decreased wrinkle left forehead   mild facial asymmetry unable to close left eye completely   decreased wrinkle left forehead   mild facial asymmetry  Lt sided neck-healing shingle scar

## 2024-12-14 NOTE — ED PROVIDER NOTE - NSFOLLOWUPINSTRUCTIONS_ED_ALL_ED_FT
Santos Palsy    WHAT YOU NEED TO KNOW:    What is Bell palsy? Bell palsy is a sudden weakness or paralysis of one side of your face. It occurs when the nerve that controls the muscles in your face becomes swollen or irritated. Bell palsy usually lasts about 2 to 3 weeks, but it can last for up to 6 months. Bell palsy can be permanent for some people. The cause of Bell palsy is not clear.  Santos Palsy    What increases my risk for Bell palsy?    Age 15 to 45 years    Pregnancy or preeclampsia (high blood pressure that develops because of pregnancy)    A virus, such as a cold, the flu, herpes simplex, or varicella (chicken pox)    Obesity    High blood pressure    Stress, lack of sleep, injury, or a short illness    A condition such as lupus, Lyme disease, Sjögren syndrome, or diabetes  What are the signs and symptoms of Bell palsy? You may first have pain behind an ear or in your face. Hours or days later, you may have any of the following on the same side of your face:    Weakness or paralysis in your face    Not being able to move your eyebrow or wrinkle your forehead    Trouble closing your eye or blinking, or your eye moves up when you try to close your eyelid    Changes in the amount of tears and saliva you make, such as dry eyes or drooling    Mouth drooping and trouble smiling or chewing    Loss of taste at the front part of your tongue    Sensitive hearing  How is Bell palsy diagnosed? Your healthcare provider will examine you and ask about your medical history. Tell your provider about your symptoms and when they started. Your provider will test how well you can move the muscles in your face. Your provider will need to rule out other causes of paralysis, such as a stroke. Some stroke and Bell palsy symptoms are similar, but only Bell palsy prevents movement of forehead muscles. Bell palsy only affects your face. You may also need any of the following:    An electromyography (EMG) may be used to measure the electrical activity of your muscles. An EMG also tests the nerves that control muscles.    A CT or MRI of your brain may be done to rule out other causes of your paralysis. You may be given contrast liquid to help your brain show up better in the pictures. Tell the healthcare provider if you have ever had an allergic reaction to contrast liquid. Do not enter the MRI room with anything metal. Metal can cause serious injury. Tell the provider if you have any metal in or on your body.  How is Bell palsy treated? Bell palsy often goes away without treatment. Some treatments may help you get better faster or help prevent other problems caused by Bell palsy. You may need any of the following:    Steroids may be given to decrease swelling and irritation of the nerve in your face. Your symptoms can go away faster if you get steroids 72 hours from when your paralysis started.    Antiviral medicine may be given if your provider thinks a virus caused your Bell palsy.    Acetaminophen decreases pain and fever. It is available without a doctor's order. Ask how much to take and how often to take it. Follow directions. Read the labels of all other medicines you are using to see if they also contain acetaminophen, or ask your doctor or pharmacist. Acetaminophen can cause liver damage if not taken correctly.    NSAIDs, such as ibuprofen, help decrease swelling, pain, and fever. This medicine is available with or without a doctor's order. NSAIDs can cause stomach bleeding or kidney problems in certain people. If you take blood thinner medicine, always ask your healthcare provider if NSAIDs are safe for you. Always read the medicine label and follow directions.  What else can I do to help manage Bell palsy?    Eye care may be needed to prevent vision changes, eye damage, and infection. Use eye drops during the day and an ointment at night, as directed. You may need to wear an eye patch during the day. Wear sunglasses to protect your eye from direct sunlight. Stay away from places that have particles in the air that may harm your eye. You may also need to tape your eye shut while you sleep. Get your eye checked as directed if your symptoms last longer than 3 weeks.  Eye Patch      Eat soft foods that are easy to chew and swallow. These foods may be chopped, ground, mashed, pureed, and moist. Do not eat hard or chewy foods. These foods may fall out of your mouth where it droops. Ask your healthcare provider or dietitian about the foods you should eat.    Go to speech therapy if you have trouble eating or drinking that continues longer than 3 weeks. A speech therapist can teach you new ways to eat and drink. The therapist can show you ways to prevent or manage problems with drooling or swallowing. You may also learn to plan several small meals instead of a few large meals each day.    Use ear plugs or ear protectors around loud noises, such as a lawnmower or loud music. Foam earplugs that completely block your ear canal can help decrease your sensitive hearing. Do not listen to loud music through headphones or earphones.    Go to physical therapy as directed. A physical therapist can teach you how to massage and exercise the muscles in your face. These exercises may help prevent long-term problems such as muscle spasms and permanent paralysis in your face.    Talk to a mental health therapist if your symptoms are causing a low mood or anxiety. The therapist can help you cope while you recover.  When should I seek immediate care?    You have vision changes or a loss of vision.    When should I call my doctor?    You have a fever.    Your eye becomes red, irritated, or painful.    Your symptoms have not gone away after 3 weeks.    You have questions or concerns about your condition or care.  CARE AGREEMENT:    You have the right to help plan your care. Learn about your health condition and how it may be treated. Discuss treatment options with your healthcare providers to decide what care you want to receive. You always have the right to refuse treatment.      Take prednisone 1 tablet every day after breakfast for another 5 days   prednisone raises your blood sugar, watch your sugar intake   facial exercise, massage,   cover your left eye when you go to sleep so it will not dried up   place artificial tear eyedrops to your left eye as often as possible to keep it moist   follow-up with neurology in a few days

## 2024-12-14 NOTE — ED ADULT NURSE NOTE - OBJECTIVE STATEMENT
Patient was BIB wife, As per wife, pt noted slurred speech and Rt facial drooping around 2PM x today. code stroke was activated and upon evaluation by MD ron mendez, code stoke was cancelled. Patient A&Ox3, ambulatory.  Patient denies chest pain, denies N/V/D, No SOB noted.

## 2024-12-14 NOTE — ED PROVIDER NOTE - PATIENT PORTAL LINK FT
You can access the FollowMyHealth Patient Portal offered by [facility] by registering at the following website: [email or web address]. By joining [employer]’s FollowMyHealth portal, you will also be able to view your health information using other applications (apps) compatible with our system.

## 2024-12-14 NOTE — ED PROVIDER NOTE - OBJECTIVE STATEMENT
64-year-old male with history of DM, HTN, HLD.  Last seen normal was Thursday.  Patient claims spoke with patient, but did not pay attention or look at patient since Thursday.  Today around 2 PM noted patient with slurred speech and facial asymmetry.  Patient endorses he noted something is wrong around 8 PM yesterday while brushing his teeth, water was dripping down his mouth.  Patient denies any focal weakness or numbness 64-year-old male with history of DM, HTN, HLD.  Last seen normal was Thursday.  Patient claims spoke with patient, but did not pay attention or look at patient since Thursday.  Today around 2 PM noted patient with slurred speech and facial asymmetry.  Patient endorses he noted something is wrong around 8 PM yesterday while brushing his teeth, water was dripping down his mouth.  Patient denies any focal weakness or numbness. Patient was treated for shingle to the left side of the neck a few weeks ago

## 2024-12-14 NOTE — ED PROVIDER NOTE - NEUROLOGICAL, MLM
Alert and oriented, no focal deficits, no motor or sensory deficits. muscle strength 5/5, finger-to-nose intact, heel-to-shin intact, Romberg negative, no ataxia

## 2024-12-14 NOTE — ED ADULT NURSE NOTE - BEFAST BALANCE
No

## 2024-12-15 LAB
B BURGDOR C6 AB SER-ACNC: NEGATIVE — SIGNIFICANT CHANGE UP
B BURGDOR IGG+IGM SER QL IB: SIGNIFICANT CHANGE UP
B BURGDOR IGG+IGM SER-ACNC: 0.16 INDEX — SIGNIFICANT CHANGE UP (ref 0.01–0.9)
HCV AB S/CO SERPL IA: 0.08 S/CO — SIGNIFICANT CHANGE UP (ref 0–0.99)
HCV AB SERPL-IMP: SIGNIFICANT CHANGE UP

## 2025-04-22 NOTE — PATIENT PROFILE ADULT. - NSSUBSTANCEUSE_GEN_ALL_CORE_SD
Treatment Goal Explanation (Does Not Render In The Note): Stable for the purposes of categorizing medical decision making is defined by the specific treatment goals for an individual patient. A patient that is not at their treatment goal is not stable, even if the condition has not changed and there is no short- term threat to life or function. Treatment Goal Met?: no never used